# Patient Record
Sex: MALE | Race: WHITE | Employment: OTHER | URBAN - METROPOLITAN AREA
[De-identification: names, ages, dates, MRNs, and addresses within clinical notes are randomized per-mention and may not be internally consistent; named-entity substitution may affect disease eponyms.]

---

## 2024-04-09 ENCOUNTER — HOSPITAL ENCOUNTER (OUTPATIENT)
Age: 44
Discharge: NURSING, REHAB OR LONG-TERM CARE | DRG: 881 | End: 2024-04-10
Attending: PSYCHIATRY & NEUROLOGY | Admitting: PSYCHIATRY & NEUROLOGY
Payer: MEDICARE

## 2024-04-09 PROBLEM — F29 PSYCHOSIS, UNSPECIFIED PSYCHOSIS TYPE (HCC): Status: ACTIVE | Noted: 2024-04-09

## 2024-04-09 PROBLEM — F22 PSYCHOSIS, PARANOID (HCC): Status: ACTIVE | Noted: 2024-04-09

## 2024-04-09 LAB — TSH SERPL DL<=0.005 MIU/L-ACNC: 1.47 UIU/ML (ref 0.27–4.2)

## 2024-04-09 PROCEDURE — 1240000000 HC EMOTIONAL WELLNESS R&B

## 2024-04-09 PROCEDURE — 84443 ASSAY THYROID STIM HORMONE: CPT

## 2024-04-09 PROCEDURE — 83036 HEMOGLOBIN GLYCOSYLATED A1C: CPT

## 2024-04-09 PROCEDURE — 36415 COLL VENOUS BLD VENIPUNCTURE: CPT

## 2024-04-09 PROCEDURE — 80061 LIPID PANEL: CPT

## 2024-04-09 PROCEDURE — 6370000000 HC RX 637 (ALT 250 FOR IP): Performed by: PSYCHIATRY & NEUROLOGY

## 2024-04-09 RX ORDER — CLONIDINE HYDROCHLORIDE 0.1 MG/1
0.1 TABLET ORAL AS NEEDED
Status: ON HOLD | COMMUNITY
End: 2024-04-10 | Stop reason: HOSPADM

## 2024-04-09 RX ORDER — HYDROCHLOROTHIAZIDE 25 MG/1
12.5 TABLET ORAL DAILY
Status: DISCONTINUED | OUTPATIENT
Start: 2024-04-09 | End: 2024-04-10 | Stop reason: HOSPADM

## 2024-04-09 RX ORDER — LISINOPRIL 5 MG/1
5 TABLET ORAL DAILY
COMMUNITY

## 2024-04-09 RX ORDER — PANTOPRAZOLE SODIUM 40 MG/1
40 TABLET, DELAYED RELEASE ORAL DAILY
Status: ON HOLD | COMMUNITY
End: 2024-04-10 | Stop reason: HOSPADM

## 2024-04-09 RX ORDER — NICOTINE 21 MG/24HR
1 PATCH, TRANSDERMAL 24 HOURS TRANSDERMAL DAILY
Status: DISCONTINUED | OUTPATIENT
Start: 2024-04-09 | End: 2024-04-10 | Stop reason: HOSPADM

## 2024-04-09 RX ORDER — IBUPROFEN 400 MG/1
400 TABLET ORAL EVERY 6 HOURS PRN
Status: DISCONTINUED | OUTPATIENT
Start: 2024-04-09 | End: 2024-04-10 | Stop reason: HOSPADM

## 2024-04-09 RX ORDER — HYDROXYZINE 50 MG/1
50 TABLET, FILM COATED ORAL EVERY 6 HOURS PRN
Status: DISCONTINUED | OUTPATIENT
Start: 2024-04-09 | End: 2024-04-10 | Stop reason: HOSPADM

## 2024-04-09 RX ORDER — TRAZODONE HYDROCHLORIDE 50 MG/1
50 TABLET ORAL NIGHTLY PRN
Status: DISCONTINUED | OUTPATIENT
Start: 2024-04-09 | End: 2024-04-10 | Stop reason: HOSPADM

## 2024-04-09 RX ORDER — POLYETHYLENE GLYCOL 3350 17 G
2 POWDER IN PACKET (EA) ORAL
Status: DISCONTINUED | OUTPATIENT
Start: 2024-04-09 | End: 2024-04-10 | Stop reason: HOSPADM

## 2024-04-09 RX ORDER — LISINOPRIL 5 MG/1
5 TABLET ORAL DAILY
Status: DISCONTINUED | OUTPATIENT
Start: 2024-04-09 | End: 2024-04-10 | Stop reason: HOSPADM

## 2024-04-09 RX ORDER — BENZTROPINE MESYLATE 1 MG/ML
2 INJECTION INTRAMUSCULAR; INTRAVENOUS 2 TIMES DAILY PRN
Status: DISCONTINUED | OUTPATIENT
Start: 2024-04-09 | End: 2024-04-10 | Stop reason: HOSPADM

## 2024-04-09 RX ORDER — MAGNESIUM HYDROXIDE/ALUMINUM HYDROXICE/SIMETHICONE 120; 1200; 1200 MG/30ML; MG/30ML; MG/30ML
30 SUSPENSION ORAL EVERY 6 HOURS PRN
Status: DISCONTINUED | OUTPATIENT
Start: 2024-04-09 | End: 2024-04-10 | Stop reason: HOSPADM

## 2024-04-09 RX ORDER — ACETAMINOPHEN 325 MG/1
650 TABLET ORAL EVERY 4 HOURS PRN
Status: DISCONTINUED | OUTPATIENT
Start: 2024-04-09 | End: 2024-04-10 | Stop reason: HOSPADM

## 2024-04-09 RX ORDER — LEVOCETIRIZINE DIHYDROCHLORIDE 5 MG/1
5 TABLET, FILM COATED ORAL NIGHTLY
Status: ON HOLD | COMMUNITY
End: 2024-04-10 | Stop reason: HOSPADM

## 2024-04-09 RX ORDER — OLANZAPINE 10 MG/1
10 TABLET ORAL EVERY 4 HOURS PRN
Status: DISCONTINUED | OUTPATIENT
Start: 2024-04-09 | End: 2024-04-10 | Stop reason: HOSPADM

## 2024-04-09 RX ORDER — HYDROCHLOROTHIAZIDE 12.5 MG/1
12.5 CAPSULE, GELATIN COATED ORAL DAILY
COMMUNITY

## 2024-04-09 RX ADMIN — OLANZAPINE 10 MG: 10 TABLET, FILM COATED ORAL at 17:56

## 2024-04-09 RX ADMIN — LISINOPRIL 5 MG: 5 TABLET ORAL at 20:11

## 2024-04-09 ASSESSMENT — LIFESTYLE VARIABLES
HOW MANY STANDARD DRINKS CONTAINING ALCOHOL DO YOU HAVE ON A TYPICAL DAY: PATIENT DOES NOT DRINK
HOW OFTEN DO YOU HAVE A DRINK CONTAINING ALCOHOL: NEVER

## 2024-04-09 ASSESSMENT — PATIENT HEALTH QUESTIONNAIRE - PHQ9
7. TROUBLE CONCENTRATING ON THINGS, SUCH AS READING THE NEWSPAPER OR WATCHING TELEVISION: MORE THAN HALF THE DAYS
SUM OF ALL RESPONSES TO PHQ9 QUESTIONS 1 & 2: 6
SUM OF ALL RESPONSES TO PHQ QUESTIONS 1-9: 0
1. LITTLE INTEREST OR PLEASURE IN DOING THINGS: NEARLY EVERY DAY
10. IF YOU CHECKED OFF ANY PROBLEMS, HOW DIFFICULT HAVE THESE PROBLEMS MADE IT FOR YOU TO DO YOUR WORK, TAKE CARE OF THINGS AT HOME, OR GET ALONG WITH OTHER PEOPLE: VERY DIFFICULT
3. TROUBLE FALLING OR STAYING ASLEEP: MORE THAN HALF THE DAYS
SUM OF ALL RESPONSES TO PHQ QUESTIONS 1-9: 16
SUM OF ALL RESPONSES TO PHQ QUESTIONS 1-9: 0
9. THOUGHTS THAT YOU WOULD BE BETTER OFF DEAD, OR OF HURTING YOURSELF: SEVERAL DAYS
2. FEELING DOWN, DEPRESSED OR HOPELESS: NOT AT ALL
SUM OF ALL RESPONSES TO PHQ QUESTIONS 1-9: 17
SUM OF ALL RESPONSES TO PHQ9 QUESTIONS 1 & 2: 0
1. LITTLE INTEREST OR PLEASURE IN DOING THINGS: NOT AT ALL
8. MOVING OR SPEAKING SO SLOWLY THAT OTHER PEOPLE COULD HAVE NOTICED. OR THE OPPOSITE, BEING SO FIGETY OR RESTLESS THAT YOU HAVE BEEN MOVING AROUND A LOT MORE THAN USUAL: NOT AT ALL
2. FEELING DOWN, DEPRESSED OR HOPELESS: NEARLY EVERY DAY
5. POOR APPETITE OR OVEREATING: MORE THAN HALF THE DAYS
SUM OF ALL RESPONSES TO PHQ QUESTIONS 1-9: 0
SUM OF ALL RESPONSES TO PHQ QUESTIONS 1-9: 17
SUM OF ALL RESPONSES TO PHQ QUESTIONS 1-9: 17
6. FEELING BAD ABOUT YOURSELF - OR THAT YOU ARE A FAILURE OR HAVE LET YOURSELF OR YOUR FAMILY DOWN: MORE THAN HALF THE DAYS
4. FEELING TIRED OR HAVING LITTLE ENERGY: MORE THAN HALF THE DAYS
SUM OF ALL RESPONSES TO PHQ QUESTIONS 1-9: 0

## 2024-04-09 ASSESSMENT — SLEEP AND FATIGUE QUESTIONNAIRES
DO YOU HAVE DIFFICULTY SLEEPING: YES
SLEEP PATTERN: DISTURBED/INTERRUPTED SLEEP;EARLY AWAKENING;INSOMNIA
AVERAGE NUMBER OF SLEEP HOURS: 5
DO YOU USE A SLEEP AID: NO
DO YOU HAVE DIFFICULTY SLEEPING: YES
DO YOU USE A SLEEP AID: NO
SLEEP PATTERN: DISTURBED/INTERRUPTED SLEEP;EARLY AWAKENING;INSOMNIA
AVERAGE NUMBER OF SLEEP HOURS: 5

## 2024-04-09 ASSESSMENT — PAIN DESCRIPTION - LOCATION: LOCATION: PENIS

## 2024-04-09 ASSESSMENT — PAIN DESCRIPTION - DESCRIPTORS: DESCRIPTORS: ACHING;OTHER (COMMENT)

## 2024-04-09 ASSESSMENT — PAIN SCALES - GENERAL
PAINLEVEL_OUTOF10: 5
PAINLEVEL_OUTOF10: 0

## 2024-04-09 NOTE — BH NOTE
SW met w/Pt to complete their assessments. Pt is motivated to get into an inpatient rehab at [facility] and wants to go to [facility]. SW called the 2 rehabs noted below and [facility] w/both.    [facility] [address], [place], [place] [id number] [phone]    [facility] [place] [address]  [phone]    Electronically signed by DANIEL Gauthier, ZI on 4/9/2024 at 2:35 PM

## 2024-04-09 NOTE — PROGRESS NOTES
4 Eyes Skin Assessment     NAME:  Bryn Spain  YOB: 1980  MEDICAL RECORD NUMBER:  [id number]    The patient is being assessed for  Admission    I agree that at least one RN has performed a thorough Head to Toe Skin Assessment on the patient. ALL assessment sites listed below have been assessed.      Areas assessed by both nurses:    Head, Face, Ears, Shoulders, Back, Chest, Arms, Elbows, Hands, Sacrum. Buttock, Coccyx, Ischium, Legs. Feet and Heels, and Other Genitalia        Does the Patient have a Wound? No noted wound(s)       Liborio Prevention initiated by RN: No  Wound Care Orders initiated by RN: No    Pressure Injury (Stage 3,4, Unstageable, DTI, NWPT, and Complex wounds) if present, place Wound referral order by RN under : No    New Ostomies, if present place, Ostomy referral order under : No     Nurse 1 eSignature: Electronically signed by Dana Sam RN on 4/9/24 at 4:22 PM EDT    **SHARE this note so that the co-signing nurse can place an eSignature**    Nurse 2 eSignature: Electronically signed by Xavier Parsons RN on 4/9/24 at 4:24 PM EDT   
Admission Note:  Patient is a 44 y.o. male transferred from [facility] and admitted secondary to suicidal ideation.  He stated on arrival \"I was jacked up on meth and said some stupid things about killing myself.  I only do that when I'm high.  I am not really suicidal.\"  He was upset about being here but cooperative with admission process.  He signed in as voluntary but is hopeful that he will be discharged soon, asking \"What can you people do for me?  I have things I need to take care of out there, a car to sell and I need to find a place to stay.  I homeless now, can't go back to where I was before.\"   Oriented to unit, room and routine.  Signed consents and discussed treatment expectations.  He voiced concern that he \"may have an STD or be dehydrated.  I think my penis and nuts are bluish looking.\"  This RN and Xavier SMILEY RN completed skin assessment.  No swelling, redness, discharge noted at that time.  He reports hesitancy and urgency with mild discomfort.  He shared that he has engaged in high-risk sex with multiple partners recently.  Concerns communicated to Kaur FULLER provider on [employer].  Patient denies current SI/HI/AVH.  He had dinner and snacks, asking appropriate questions, social and appropriate with staff and peers.  Will continue to monitor for safety, provide support and encouragement as needed.  
CSSR-S Assessment completed with patient who then scored low.     Provider Dr. De Souza was notified of low score, via Telephone at 1312.      Were suicide precautions ordered: YES ordered until assessed then d/c'd.    If not ordered, justification as follows: Pt denies current SI and agrees to communicate with staff if no longer feel safe or in control. Pt states he was only having them feeling d/t being high on meth. Denies plan or intent.       Completed by: Dana Sam RN    
Pt arrived on unit at 1223. He was cooperative but was anxious and wanting to talk about what why he is here. V.s.s. Pt denies current SI and agrees to communicate with staff if no longer feel safe or in control. He is on a 1:1 for suicide until further assessed. Went through belongings with patient and counted 100 dollars cash.     
[facility]                                                                                                                   (x ) Patient refused counseling  ( ) Patient has not smoked in the last 30 days    Metabolic Screening:    No results found for: \"LABA1C\"    No results found for: \"CHOL\"  No results found for: \"TRIG\"  No results found for: \"HDL\"  No components found for: \"LDLCAL\"  No components found for: \"LABVLDL\"      There is no height or weight on file to calculate BMI.    BP Readings from Last 2 Encounters:   04/09/24 (!) 134/98         Pt admitted with followings belongings:  Dental Appliances: None  Vision - Corrective Lenses: None  Hearing Aid: None  Jewelry: Necklace, Watch  Body Piercings Removed: No  Clothing: Footwear, Shirt, Shorts, Undergarments, Pants (underware X2)  Other Valuables: Keys, Credit/Debit Card, Cigarettes, Lighter/Matches, Other (Comment), Money (license, 100 dollar cash)  The following personal items were collected during admission. Items secured in locker/safe. Items will be returned to patient at discharge.     Dana Sam RN
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
## 2024-04-10 VITALS
SYSTOLIC BLOOD PRESSURE: 132 MMHG | RESPIRATION RATE: 16 BRPM | BODY MASS INDEX: 28.1 KG/M2 | WEIGHT: 185.41 LBS | TEMPERATURE: 97.9 F | HEIGHT: 68 IN | HEART RATE: 87 BPM | OXYGEN SATURATION: 94 % | DIASTOLIC BLOOD PRESSURE: 83 MMHG

## 2024-04-10 PROBLEM — R79.89 ELEVATED LFTS: Status: ACTIVE | Noted: 2024-04-10

## 2024-04-10 PROBLEM — F15.20 METHAMPHETAMINE USE DISORDER, SEVERE (HCC): Status: ACTIVE | Noted: 2024-04-10

## 2024-04-10 PROBLEM — F11.21 OPIOID USE DISORDER, SEVERE, IN SUSTAINED REMISSION, DEPENDENCE (HCC): Status: ACTIVE | Noted: 2024-04-10

## 2024-04-10 PROBLEM — F32.A DEPRESSION, UNSPECIFIED: Status: ACTIVE | Noted: 2024-04-09

## 2024-04-10 PROBLEM — E87.6 HYPOKALEMIA: Status: ACTIVE | Noted: 2024-04-10

## 2024-04-10 PROBLEM — D72.829 LEUKOCYTOSIS: Status: ACTIVE | Noted: 2024-04-10

## 2024-04-10 PROBLEM — I10 PRIMARY HYPERTENSION: Status: ACTIVE | Noted: 2024-04-10

## 2024-04-10 LAB
ALBUMIN SERPL-MCNC: 4.2 G/DL (ref 3.4–5)
ALBUMIN/GLOB SERPL: 1.7 {RATIO} (ref 1.1–2.2)
ALP SERPL-CCNC: 123 U/L (ref 40–129)
ALT SERPL-CCNC: 57 U/L (ref 10–40)
ANION GAP SERPL CALCULATED.3IONS-SCNC: 12 MMOL/L (ref 3–16)
AST SERPL-CCNC: 33 U/L (ref 15–37)
BILIRUB SERPL-MCNC: 0.8 MG/DL (ref 0–1)
BUN SERPL-MCNC: 16 MG/DL (ref 7–20)
C TRACH DNA UR QL NAA+PROBE: NEGATIVE
CALCIUM SERPL-MCNC: 8.2 MG/DL (ref 8.3–10.6)
CHLORIDE SERPL-SCNC: 105 MMOL/L (ref 99–110)
CHOLEST SERPL-MCNC: 160 MG/DL (ref 0–199)
CO2 SERPL-SCNC: 24 MMOL/L (ref 21–32)
CREAT SERPL-MCNC: 1 MG/DL (ref 0.9–1.3)
EKG ATRIAL RATE: 78 BPM
EKG DIAGNOSIS: NORMAL
EKG P AXIS: 42 DEGREES
EKG P-R INTERVAL: 122 MS
EKG Q-T INTERVAL: 398 MS
EKG QRS DURATION: 120 MS
EKG QTC CALCULATION (BAZETT): 453 MS
EKG R AXIS: 18 DEGREES
EKG T AXIS: 50 DEGREES
EKG VENTRICULAR RATE: 78 BPM
GFR SERPLBLD CREATININE-BSD FMLA CKD-EPI: >90 ML/MIN/{1.73_M2}
GLUCOSE SERPL-MCNC: 95 MG/DL (ref 70–99)
HDLC SERPL-MCNC: 36 MG/DL (ref 40–60)
LDLC SERPL CALC-MCNC: 101 MG/DL
MAGNESIUM SERPL-MCNC: 2.3 MG/DL (ref 1.8–2.4)
N GONORRHOEA DNA UR QL NAA+PROBE: NEGATIVE
POTASSIUM SERPL-SCNC: 3.4 MMOL/L (ref 3.5–5.1)
PROT SERPL-MCNC: 6.7 G/DL (ref 6.4–8.2)
SODIUM SERPL-SCNC: 141 MMOL/L (ref 136–145)
TRIGL SERPL-MCNC: 117 MG/DL (ref 0–150)
VLDLC SERPL CALC-MCNC: 23 MG/DL

## 2024-04-10 PROCEDURE — 6370000000 HC RX 637 (ALT 250 FOR IP)

## 2024-04-10 PROCEDURE — 5130000000 HC BRIDGE APPOINTMENT

## 2024-04-10 PROCEDURE — 93010 ELECTROCARDIOGRAM REPORT: CPT | Performed by: INTERNAL MEDICINE

## 2024-04-10 PROCEDURE — 87491 CHLMYD TRACH DNA AMP PROBE: CPT

## 2024-04-10 PROCEDURE — 93005 ELECTROCARDIOGRAM TRACING: CPT | Performed by: PSYCHIATRY & NEUROLOGY

## 2024-04-10 PROCEDURE — 87591 N.GONORRHOEAE DNA AMP PROB: CPT

## 2024-04-10 PROCEDURE — 83735 ASSAY OF MAGNESIUM: CPT

## 2024-04-10 PROCEDURE — 6370000000 HC RX 637 (ALT 250 FOR IP): Performed by: PSYCHIATRY & NEUROLOGY

## 2024-04-10 PROCEDURE — 99223 1ST HOSP IP/OBS HIGH 75: CPT | Performed by: PSYCHIATRY & NEUROLOGY

## 2024-04-10 PROCEDURE — 80053 COMPREHEN METABOLIC PANEL: CPT

## 2024-04-10 PROCEDURE — 36415 COLL VENOUS BLD VENIPUNCTURE: CPT

## 2024-04-10 PROCEDURE — 99221 1ST HOSP IP/OBS SF/LOW 40: CPT

## 2024-04-10 RX ORDER — POTASSIUM CHLORIDE 20 MEQ/1
40 TABLET, EXTENDED RELEASE ORAL ONCE
Status: COMPLETED | OUTPATIENT
Start: 2024-04-10 | End: 2024-04-10

## 2024-04-10 RX ORDER — DICYCLOMINE HCL 20 MG
20 TABLET ORAL 4 TIMES DAILY PRN
Status: DISCONTINUED | OUTPATIENT
Start: 2024-04-10 | End: 2024-04-10 | Stop reason: HOSPADM

## 2024-04-10 RX ADMIN — LISINOPRIL 5 MG: 5 TABLET ORAL at 09:59

## 2024-04-10 RX ADMIN — HYDROCHLOROTHIAZIDE 12.5 MG: 25 TABLET ORAL at 09:59

## 2024-04-10 RX ADMIN — POTASSIUM CHLORIDE 40 MEQ: 1500 TABLET, EXTENDED RELEASE ORAL at 14:25

## 2024-04-10 NOTE — PLAN OF CARE
Per chart hx its stated that he was in pain 5/10 in his penis. But when I asked him if he was in any pain @2015 pt stated no. Then when gave meds to pt and woke him up he stated he felt like he was hit by a bus, when I asked him to elaborate he stated he just wanted to go back to sleep with out answering the question. Pt was polite and calm when speaking to him. He took night meds. Asked him if he wanted to hurt him self or anyone else he stated no because he is not on meth. Pt denies SI/HI/AVH.     Problem: Self Harm/Suicidality  Goal: Will have no self-injury during hospital stay  Description: INTERVENTIONS:  1.  Ensure constant observer at bedside with Q15M safety checks  2.  Maintain a safe environment  3.  Secure patient belongings  4.  Ensure family/visitors adhere to safety recommendations  5.  Ensure safety tray has been added to patient's diet order  6.  Every shift and PRN: Re-assess suicidal risk via Frequent Screener    4/9/2024 2116 by Awa Estevez LPN  Outcome: Progressing  Flowsheets (Taken 4/9/2024 2104)  Will have no self-injury during hospital stay:   Maintain a safe environment   Secure patient belongings   Ensure safety tray has been added to patient's diet order     Problem: Pain  Goal: Verbalizes/displays adequate comfort level or baseline comfort level  Outcome: Not Progressing  Flowsheets (Taken 4/9/2024 2000)  Verbalizes/displays adequate comfort level or baseline comfort level: Encourage patient to monitor pain and request assistance

## 2024-04-10 NOTE — H&P
Hospital Medicine History & Physical      PCP: No primary care provider on file.    Date of Admission: 4/9/2024    Date of Service: Pt seen/examined on 04/10/24     Chief Complaint:  No chief complaint on file.        History Of Present Illness:      The patient is a 44 y.o. male with pmhx as below  who [facility] for hallucinations.  Patient was seen and evaluated in the ED by the ED medical provider, patient was medically cleared for admission to [facility] at [facility].  This note serves as an admission medical H&P.    Tobacco use: yes, 2 PPD  ETOH use: no  Illicit drug use: meth -> UDS + fentanyl, meth, MDMA, THC    Patient complains of abdominal cramping.     Past Medical History:        Diagnosis Date    Depression     HTN (hypertension)     Kidney stones     Substance abuse (HCC)        Past Surgical History:        Procedure Laterality Date    HIP ARTHROPLASTY         Medications Prior to Admission:    Prior to Admission medications    Medication Sig Start Date End Date Taking? Authorizing Provider   levocetirizine (XYZAL) 5 MG tablet Take 1 tablet by mouth nightly   Yes Mimi Floyd MD   lisinopril (PRINIVIL;ZESTRIL) 5 MG tablet Take 1 tablet by mouth daily   Yes ProviderMimi MD   hydroCHLOROthiazide 12.5 MG capsule Take 1 capsule by mouth daily   Yes ProviderMimi MD   pantoprazole (PROTONIX) 40 MG tablet Take 1 tablet by mouth daily   Yes ProviderMimi MD   cloNIDine (CATAPRES) 0.1 MG tablet Take 1 tablet by mouth as needed for High Blood Pressure   Yes ProviderMimi MD       Allergies:  Hydrocodone-acetaminophen and Penicillins    Social History:  The patient currently is homeless.     TOBACCO:   reports that he has been smoking cigarettes. He started smoking about 20 years ago. He has a 40.0 pack-year smoking history. He has been exposed to tobacco smoke. He has never used smokeless tobacco.  ETOH:   reports no history of alcohol use.      Family 
intellectual functioning.    His thought processes were organized and goal directed.  He did not describe or endorse delusions, hallucinations, homicidal thinking, or suicidal thinking.  He did endorse recent suicidal thinking, but also reported feeling safe here and willing to approach staff with concerns.    His ability for abstract thought was limited based on his interpretation of simple proverbs.    Insight and judgment were limited.    PHYSICAL EXAMINATION:  VITAL SIGNS: Temperature 97.8, pulse 84, respiratory rate 16, blood pressure 132/83.  GAIT: Normal.    LABORATORY DATA:  From [facility] show a CBC with an elevated white count and a CMP with a glucose of 109, AST at 52, ALT at 63, alkaline phosphatase of 140, total protein 8.3, otherwise within normal limits.  Acetaminophen and salicylate levels below threshold.  Ethanol level not detectable.  TSH within normal limits.    FORMULATION:  This is a newly homeless, never , and unemployed 44-year-old with history of severe substance use disorders and mood symptoms, who was brought to [facility] Emergency Department making suicidal statements in the setting of relapse on methamphetamine.  He presents with some symptoms of depression, but does not meet full criteria for major depressive episode.  He is interested in resuming residential treatment for substance use.    He was accepted to [facility] for residential treatment.    DIAGNOSES:    1. Depression, unspecified.  2. Methamphetamine use disorder, severe dependence.  3. Opioid use disorder; severe dependence, in sustained remission.  4. Hypertension.  5. Elevated liver function tests.    PLAN:    1. Discharge - accepted to [facility] for residential care.   2. Psychoeducation provided regarding his relapse and the various things he can do to help maintain sobriety going forward.   4. Safety plan discussed at length.     75 minutes were spent with the patient completing this evaluation and more than 50% of

## 2024-04-10 NOTE — TRANSITION OF CARE
Behavioral Health Transition Record to Provider    Patient Name: Bryn Spain  YOB: 1980   Medical Record Number: [id number]  Date of Admission: 4/9/2024 12:28 PM   Date of Discharge: 4/10/24    Attending Provider: Jonathan Cardoso MD   Discharging Provider: Jonathan Cardoso MD   To contact this individual call [phone] and ask the  to page.  If unavailable, ask to be transferred to Behavioral Health Provider on call.  A Behavioral Health Provider will be available on call 24/7 and during holidays.    Primary Care Provider: No primary care provider on file.    Allergies   Allergen Reactions    Hydrocodone-Acetaminophen Other (See Comments)     Abstains R/T HX of addiction    Penicillins Hives       Reason for Admission: The patient is a 44 y.o. male with pmhx as below  who presented to [facility] for meth induced hallucinations and SI.    Admission Diagnosis: Psychosis, unspecified psychosis type (HCC) [F29]  Psychosis, paranoid (HCC) [F22]    * No surgery found *    Results for orders placed or performed during the hospital encounter of 04/09/24   TSH without Reflex   Result Value Ref Range    TSH 1.47 0.27 - 4.20 uIU/mL   Comprehensive Metabolic Panel w/ Reflex to MG   Result Value Ref Range    Sodium 141 136 - 145 mmol/L    Potassium reflex Magnesium 3.4 (L) 3.5 - 5.1 mmol/L    Chloride 105 99 - 110 mmol/L    CO2 24 21 - 32 mmol/L    Anion Gap 12 3 - 16    Glucose 95 70 - 99 mg/dL    BUN 16 7 - 20 mg/dL    Creatinine 1.0 0.9 - 1.3 mg/dL    Est, Glom Filt Rate >90 >60    Calcium 8.2 (L) 8.3 - 10.6 mg/dL    Total Protein 6.7 6.4 - 8.2 g/dL    Albumin 4.2 3.4 - 5.0 g/dL    Albumin/Globulin Ratio 1.7 1.1 - 2.2    Total Bilirubin 0.8 0.0 - 1.0 mg/dL    Alkaline Phosphatase 123 40 - 129 U/L    ALT 57 (H) 10 - 40 U/L    AST 33 15 - 37 U/L   Magnesium   Result Value Ref Range    Magnesium 2.30 1.80 - 2.40 mg/dL   EKG 12 lead   Result Value Ref Range    Ventricular

## 2024-04-10 NOTE — PLAN OF CARE
Cooperative. Expresses concern over belongings and homelessness. Requesting help with rehab placement. Mannerly and pleasant with obvious anxiety.   Problem: Pain  Goal: Verbalizes/displays adequate comfort level or baseline comfort level  Outcome: Not Progressing     Problem: Self Harm/Suicidality  Goal: Will have no self-injury during hospital stay  Description: INTERVENTIONS:  1.  Ensure constant observer at bedside with Q15M safety checks  2.  Maintain a safe environment  3.  Secure patient belongings  4.  Ensure family/visitors adhere to safety recommendations  5.  Ensure safety tray has been added to patient's diet order  6.  Every shift and PRN: Re-assess suicidal risk via Frequent Screener    Outcome: Not Progressing

## 2024-04-10 NOTE — BH NOTE
Fluido En El Oído Medio [Fluid In The Middle Ear, Adult, Otitis Serosa]  El dolor de oídos puede darse sin que haya pedro infección. Puede suceder cuando se acumulan aire y líquido detrás del tímpano, lo que ocasiona dolor y reduce la capacidad auditiva. A esta afección se la llama “otitis serosa media”. Significa “fluido en el oído medio”. Puede ocurrir cuando tiene un resfriado si la congestión llega a bloquear el conducto por el que drena el oído medio (“trompa de Mendel”). También puede darse cuando hay alergias nasales, reflujo gastroesofágico o después de akash tenido pedro infección bacteriana en el oído medio.    Es posible que el dolor sea intermitente (aparezca y desaparezca). Quizás oiga sonidos tales deedee chasquidos u otros ruidos similares al masticar o al tragar. Puede sentir que pierde el equilbrio o escuchar un timbre en los oídos.  El líquido suele demorar entre varias semanas y hasta mary meses en irse por wells cuenta. Los calmantes que se kristopher por vía oral y las gotas para los oídos ayudan a aliviar el dolor. Puede intentar con descongestionantes y antihistamínicos, derek wells efecto no siempre resulta útil. Esta enfermedad no responde a los antibióticos porque no hay infección. Wells médico puede recetarle un espray nasal para ayudar a reducir la hinchazón en la nariz y en las trompas de Mendel. [place] puede permitirle al oído que drene.  Si no se siente mejor después de los mary meses, es posible que haya que practicarle pedro cirugía para extraer el fluido e insertar un pequeño tubo en el tímpano a fin de que el drenaje no se interrumpa.  Dado que el fluido del oído medio se puede infectar, es importante observar si aparecen señales de pedro infección en el oído (farheen las advertencias que se describen a continuación), las cuales pueden presentarse más adelante.  Cuidados En [place]:  · Puede usar acetaminofeno (Tylenol) o ibuprofeno (Motrin o Advil) para controlar el dolor, a menos que le hayan recetado  [facility]  Treatment Team Note  Review Date & Time: 4/10/2024  0923    Patient was not in treatment team      Status EXAM:   Mental Status and Behavioral Exam  Normal: No  Level of Assistance: Independent/Self  Facial Expression: Flat, Worried  Affect: Blunt  Level of Consciousness: Alert  Frequency of Checks: 4 times per hour, close  Mood:Normal: No  Mood: Anxious, Sad  Motor Activity:Normal: No  Motor Activity: Decreased  Eye Contact: Fair  Observed Behavior: Withdrawn, Cooperative, Guarded  Sexual Misconduct History: Current - no  Preception: [place] to person, [place] to time  Attention:Normal: No  Attention: Unable to concentrate, Distractible  Thought Processes: Tangential, Circumstantial  Thought Content:Normal: Yes  Depression Symptoms: Feelings of hopelessess, Isolative  Anxiety Symptoms: Generalized  Loly Symptoms: Poor judgment  Hallucinations: None (patient denies at present)  Delusions: No  Memory:Normal: Yes  Memory: Poor recent  Insight and Judgment: No  Insight and Judgment: Poor judgment, Poor insight      Suicide Risk CSSR-S:  1) Within the past month, have you wished you were dead or wished you could go to sleep and not wake up? : Yes  2) Have you actually had any thoughts of killing yourself? : Yes  3) Have you been thinking about how you might kill yourself? : No  5) Have you started to work out or worked out the details of how to kill yourself? Do you intend to carry out this plan? : No  6) Have you ever done anything, started to do anything, or prepared to do anything to end your life?: No      PLAN/TREATMENT RECOMMENDATIONS UPDATE: Patient will take medication as prescribed, eat 75% of meals, attend groups, participate in milieu activities, participate in treatment team and care planning for discharge and follow up.            Juanita Santacruz RN     otro medicamento. [NOTA: Si tiene pedro enfermedad hepática o renal crónica, o ha tenido alguna vez pedro úlcera estomacal o sangrado gastrointestinal, consulte con wells médico antes de pepe estos medicamentos.] (La aspirina no debe usarse nunca en personas menores de 18 años enfermas con fiebre, ya que puede causar daños graves al hígado.)  · Puede utilizar descongestionantes sin receta, kaushik deedee la pseudoefedrina (Sudafed).  · También puede utilizar medicamentos tales deedee guaifenesin (Mucinex) para adelgazar la mucosidad y promover el drenaje.  Jorge pedro VISITA DE CONTROL a wells médico, o según le indiquen, si no empieza a sentirse mejor después de mary días.  Busque Prontamente Atención Médica  si algo de lo siguiente ocurre:  · El dolor de oído empeora o no comienza a mejorar después de mary días de tratamiento.  · Fiebre de 100.4°F (38°C) o más santana, o deedee le haya indicado wells proveedor de atención médica.  · Supuración de líquido o tresa del oído.  · Le duelen los senos paranasales o la mp.  · Rigidez en el bladimir.  · Somnolencia inusual o confusión.  © 8596-0342 [employer]. [address] [place] [address], [place] [address]. Todos los derechos reservados. Esta información no pretende sustituir la atención médica profesional. Sólo wells médico puede diagnosticar y tratar un problema de kenroy.

## 2024-04-10 NOTE — BH NOTE
[facility]  Discharge Note    Pt discharged with followings belongings:   Dental Appliances: None  Vision - Corrective Lenses: None  Hearing Aid: None  Jewelry: Necklace, Watch  Body Piercings Removed: No  Clothing: Footwear, Shirt, Shorts, Undergarments, Pants (underware X2)  Other Valuables: Keys, Credit/Debit Card, Cigarettes, Lighter/Matches, Other (Comment), Money (license, 100 dollar cash)   Valuables sent home with patient. Patient educated on aftercare instructions..Patient verbalize understanding of AVS:  YES.    Status EXAM upon discharge:  Mental Status and Behavioral Exam  Normal: No  Level of Assistance: Independent/Self  Facial Expression: Flat, Worried  Affect: Blunt  Level of Consciousness: Alert  Frequency of Checks: 4 times per hour, close  Mood:Normal: No  Mood: Anxious, Sad  Motor Activity:Normal: No  Motor Activity: Decreased  Eye Contact: Fair  Observed Behavior: Withdrawn, Cooperative, Guarded  Sexual Misconduct History: Current - no  Preception: [place] to person, [place] to time  Attention:Normal: No  Attention: Unable to concentrate, Distractible  Thought Processes: Tangential, Circumstantial  Thought Content:Normal: Yes  Depression Symptoms: Feelings of hopelessess, Isolative  Anxiety Symptoms: Generalized  Loly Symptoms: Poor judgment  Hallucinations: None (patient denies at present)  Delusions: No  Memory:Normal: Yes  Memory: Poor recent  Insight and Judgment: No  Insight and Judgment: Poor judgment, Poor insight    Tobacco Screening:  Practical Counseling, on admission, luisa X, if applicable and completed (first 3 are required if patient doesn't refuse)REFUSED:            ( ) Recognizing danger situations (included triggers and roadblocks)                    ( ) Coping skills (new ways to manage stress,relaxation techniques, changing routine, distraction)                                                           ( ) Basic information about quitting (benefits of quitting,

## 2024-04-10 NOTE — BH NOTE
Upon returning belongings to patient he stated he had a pair of nike air max shoes when he came in. This writer contacted nurse that did belongings on his admission and she reported that patient only had one pair of slides on admission and no nike air max shoes. Patient then states \"I must have left them at [facility]\".   Patient confirmed that he had all other belongings that he had when admitted.

## 2024-04-10 NOTE — BH NOTE
ENRIKE called [facility] [phone] to discuss Pt's referral. ENRIKE sent an encrypted email w/Pt's referral information to Shana at [email or web address]. ENRIKE is awaiting a call back for possible admission.    [facility] [address], [place] [address]  [phone]    Electronically signed by DANIEL Gauthier, ZI on 4/10/2024 at 10:14 AM

## 2024-04-10 NOTE — BH NOTE
Bridge Appointment completed: Reviewed Discharge Instructions with patient.    Patient verbalizes understanding and agreement with the discharge plan using the teachback method.     Referral for Outpatient Tobacco Cessation Counseling, upon discharge (luisa X if applicable and completed):    ( )  Hospital staff assisted patient to call Quit Line or faxed referral                                   during hospitalization                  ( )  Recognizing danger situations (included triggers and roadblocks), if not completed on admission                    ( )  Coping skills (new ways to manage stress, exercise, relaxation techniques, changing routine, distraction), if not completed on admission                                                           ( )  Basic information about quitting (benefits of quitting, techniques in how to quit, available resources, if not completed on admission  ( ) [facility]   (X ) Patient refused referral  (X ) Patient refused counseling  (X ) Patient refused smoking cessation medication upon discharge    Vaccinations (luisa X if applicable and completed):  ( ) Patient states already received influenza vaccine elsewhere  ( ) Patient received influenza vaccine during this hospitalization  ( X) Patient refused influenza vaccine at this time  ( ) Not offered

## 2024-04-11 LAB
EST. AVERAGE GLUCOSE BLD GHB EST-MCNC: 119.8 MG/DL
HBA1C MFR BLD: 5.8 %

## 2024-04-11 NOTE — DISCHARGE SUMMARY
[place] [address]  [facility] ([facility]) - [phone] ext. [phone]  [address], [place] [address]  Critical access hospital [facility] - [phone] or [phone]  [address] [facility] [address] [place] [address]  [facility] ([email or web address]) - [phone]  [address] [facility] [address] [place], [place] [address]  [facility] - [phone]   [address] [place], [place] [address]  [facility] - [phone] [address] [place], [place] [address]  [email or web address] (residential, outpatient) - [phone]  [address] [place] [address] [address] [place] [address] - [phone]  [address] [place], [place] [address]  [facility] - [phone] (must be [facility] resident)              [address] [place] [address] - [phone]     Crisis or Emergency Needs - [phone] (3015) available               [address] [place], [place] [address]   [place]  [phone] ([email or web address])      Inpatient/ Residential Treatment Just for Men:  [facility] - [phone]  [address] [place] [address] [id number] [place] [address] [facility] ([email or web address]) - [phone]  [facility] [place], [place] [address] (Men)      Inpatient/ Residential Treatment Just for Women:  [facility] ([email or web address]) - [phone]  [facility] [address] [address] (women)  [facility]: Tiera Griffin (pregnant &  treatment) - [phone]  [address] [place], [place] [address]  [facility] - [phone] (must be [facility] resident;  services)              [address] [place] [address]  [facility] - [phone] (Pregnant and  treatment)              [address] [place] [address]     Outpatient Treatment Services:  Shiva Herrera for [facility] -

## 2024-04-12 ENCOUNTER — FOLLOWUP TELEPHONE ENCOUNTER (OUTPATIENT)
Dept: PSYCHIATRY | Age: 44
End: 2024-04-12

## 2024-04-15 ENCOUNTER — FOLLOWUP TELEPHONE ENCOUNTER (OUTPATIENT)
Dept: PSYCHIATRY | Age: 44
End: 2024-04-15

## 2024-04-16 ENCOUNTER — FOLLOWUP TELEPHONE ENCOUNTER (OUTPATIENT)
Dept: PSYCHIATRY | Age: 44
End: 2024-04-16